# Patient Record
Sex: MALE | HISPANIC OR LATINO | Employment: STUDENT | URBAN - METROPOLITAN AREA
[De-identification: names, ages, dates, MRNs, and addresses within clinical notes are randomized per-mention and may not be internally consistent; named-entity substitution may affect disease eponyms.]

---
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
## 2025-02-28 ENCOUNTER — OFFICE VISIT (OUTPATIENT)
Dept: FAMILY MEDICINE CLINIC | Facility: CLINIC | Age: 20
End: 2025-02-28
Payer: COMMERCIAL

## 2025-02-28 VITALS
HEIGHT: 65 IN | BODY MASS INDEX: 26.66 KG/M2 | TEMPERATURE: 96.1 F | HEART RATE: 62 BPM | SYSTOLIC BLOOD PRESSURE: 126 MMHG | WEIGHT: 160 LBS | OXYGEN SATURATION: 98 % | DIASTOLIC BLOOD PRESSURE: 64 MMHG | RESPIRATION RATE: 20 BRPM

## 2025-02-28 DIAGNOSIS — Z11.59 NEED FOR HEPATITIS C SCREENING TEST: ICD-10-CM

## 2025-02-28 DIAGNOSIS — Z11.4 SCREENING FOR HIV (HUMAN IMMUNODEFICIENCY VIRUS): ICD-10-CM

## 2025-02-28 DIAGNOSIS — Z76.89 ENCOUNTER TO ESTABLISH CARE: Primary | ICD-10-CM

## 2025-02-28 DIAGNOSIS — Z87.19 HX OF HEMORRHOIDS: ICD-10-CM

## 2025-02-28 PROCEDURE — 99203 OFFICE O/P NEW LOW 30 MIN: CPT

## 2025-02-28 RX ORDER — HYDROCORTISONE 25 MG/G
CREAM TOPICAL 2 TIMES DAILY
Qty: 28 G | Refills: 0 | Status: SHIPPED | OUTPATIENT
Start: 2025-02-28 | End: 2025-03-14

## 2025-02-28 NOTE — PATIENT INSTRUCTIONS
"Patient Education     Dieta con alto contenido de fibra   Conceptos Básicos   Redactado por los médicos y editores de UpToDate   ¿Qué es la fibra? -- La fibra es gopal sustancia que se encuentra en algunas frutas, verduras y cereales. La mayor parte de la fibra pasa por el cuerpo sin digerirse, radha puede afectar la manera en que digiere otros alimentos y también puede mejorar nahid evacuaciones.  Existen dos tipos de fibra. El primer tipo se conoce anum \"fibra soluble\" y se encuentra en frutas, mino, cebada, frijoles y arvejas. El otro tipo se conoce anum \"fibra insoluble\" y se encuentra en el ilvier, el izquierdo y otros cereales.  Los dos tipos de fibra que se consumen se denominan \"fibra dietaria\".  ¿Por qué es importante la fibra para mi addie? -- La fibra puede ayudar a que nahid evacuaciones kassie más blandas y regulares. Agregar fibra a carney alimentación puede ayudar a resolver problemas anum el estreñimiento, las hemorroides y la diarrea. Además, puede ayudar a evitar \"accidentes\", si tiene problemas para controlar nahid evacuaciones.  Ingerir suficiente fibra también puede ayudar a reducir el riesgo de sufrir gopal enfermedad cardíaca, un accidente cerebrovascular (derrame) y diabetes tipo 2. Ragland se debe a que la fibra puede ayudar a bajar el colesterol y controlar el azúcar en woo.  ¿Cuánta fibra necesito? -- La cantidad recomendada de fibra es de 20 a 35 gramos por día. La etiqueta de información nutricional en los alimentos envasados indica la cantidad de fibra que tiene cada porción (figura 1).  ¿Cómo puedo asegurarme de que estoy consumiendo la cantidad suficiente de fibra? -- Para asegurarse de consumir la cantidad suficiente de fibra, coma muchas frutas, verduras y cereales que contengan fibra (tabla 1 y figura 2). Muchos cereales de desayuno también tienen mucha fibra.  Si no puede obtener suficiente fibra de los alimentos que consume, puede agregar salvado de livier a las comidas o puede usar suplementos de " "fibra, los cuales vienen en polvo, en obleas o en píldoras. Entre ellos se encuentran la semilla de psilio (ejemplos de marcas comerciales: Metamucil, Konsyl), la metilcelulosa (ejemplo de misty comercial: Citrucel), el policarbófilo (ejemplo de misty comercial: FiberCon) y la dextrina de livier (ejemplo de misty comercial: Benefiber). Si vern un suplemento de fibra, asegúrese de leer la etiqueta para saber qué cantidad kem. Si tiene dudas, pregunte a carney médico o enfermero.  ¿Cuáles son los efectos secundarios de la fibra? -- Al empezar a comer más fibra, podría sentir el área del estómago hinchada o tener gases o calambres. Puede evitar estos efectos secundarios si agrega la fibra a carney dieta de a poco.  Algunas personas se sienten peor cuando comen más fibra o cuando myrtle suplementos de fibra. Si se siente peor después de incorporar fibra a carney dieta, puede probar con menos cantidad para pamela si eso ayuda.  Todos los artículos se actualizan a medida que se descubre nueva evidencia y culmina nuestro proceso de evaluación por homólogos   Jessica artículo se recuperó de UpToDate el: Feb 28, 2024.  Artículo 15632 Versión 8.0.es-419.1  Release: 32.2.4 - C32.58  © 2024 UpToDate, Inc. Todos los derechos reservados.  figura 1: Etiqueta de información nutricional - Fibra     Jessica es un ejemplo de gopal etiqueta de información nutricional. Para saber cuánta fibra contiene un alimento, consulte el renglón que dice \"fibra dietaria\". También es importante observar el tamaño de la porción. Jesisca alimento tiene 7 gramos de fibra en cada porción y cada porción es gopal taza.  Gráfico 73747 Versión 8.0  tabla 1: Cantidad de fibra en diferentes alimentos  Alimento  Porción  Gramos de fibra    Frutas    Manzana (con cáscara) 1 manzana mediana 4.4   Plátano 1 plátano mediano 3.1   Naranjas 1 naranja 3.1   Ciruelas pasas 1 taza, sin semillas 12.4   Jugos    Manzana, sin endulzar, con ácido ascórbico agregado 1 taza 0.5   chase [place], " enlatado, endulzado 1 taza 0.2   Uva, sin endulzar, con ácido ascórbico agregado 1 taza 0.5   Simms 1 taza 0.7   Verduras    Cocidas   Frijoles verdes (habichuelas) 1 taza 4.0   Zanahorias 1/2 taza, cortadas 2.3   Arvejas 1 taza 8.8   Papa (horneada, con cáscara) 1 papa mediana 3.8   Crudas   Pepino (con piel) 1 pepino 1.5   Verena 1 taza, cortada 0.5   Tomate 1 tomate mediano 1.5   Espinaca 1 taza 0.7   Legumbres   Frijoles cocidos, enlatados, sin sal agregada 1 taza 13.9   Frijoles, enlatados 1 taza 13.6   Frijoles de lima (habas), enlatados 1 taza 11.6   Lentejas, hervidas 1 taza 15.6   Panes, pastas, harinas    Panecillos de salvado 1 panecillo mediano 5.2   Cereal de mino, cocido 1 taza 4.0   Pan stone 1 rebanada 0.6   Pan de livier integral 1 rebanada 1.9   Pasta y arroz, cocidos   Macarrones 1 taza 2.5   Arroz, integral 1 taza 3.5   Arroz, stone 1 taza 0.6   Fideos (espagueti, comunes) 1 taza 2.5   Frank secos    Almendras 1/2 taza 8.7   Maní 1/2 taza 7.9   Para saber cuánta fibra y otros nutrientes hay en diferentes alimentos, visite la central de datos de alimentación en el sitio web del [employer] [place] ([place] Department of Agriculture, USDA, FoodData Central).  Gráfico 68225 Versión 6.0  figura 2: Alimentos con fibra     Entre los alimentos con mucha fibra se incluyen las ciruelas, las manzanas, las naranjas, los plátanos, las arvejas, los frijoles verdes (habichuelas), los frijoles rojos, la mino cocida, las almendras, el maní y el pan de livier integral.  Gráfico 67351 Versión 1.0  Exención de responsabilidad y uso de la información del consumidor   Descargo de responsabilidad: esta información generalizada es un resumen limitado de información sobre el diagnóstico, el tratamiento y/o los medicamentos. No pretende ser exhaustiva y se debe utilizar anum herramienta para ayudar al usuario a comprender y/o evaluar las posibles opciones de diagnóstico y  tratamiento. No incluye toda la información sobre afecciones, tratamientos, medicamentos, efectos secundarios o riesgos puedan ser aplicables a un paciente específico. No tiene el propósito de servir anum recomendación médica ni de sustituir la recomendación médica, el diagnóstico o el tratamiento de un profesional de atención médica que se base en el examen y la evaluación de mickie profesional de la addie respecto a las circunstancias específicas y únicas del paciente. Los pacientes deben hablar con un profesional de atención médica para obtener información completa sobre carney addie, cuestiones médicas y opciones de tratamiento, incluidos los riesgos o los beneficios relacionados con el uso de medicamentos. Esta información no certifica que los tratamientos o medicamentos kassie seguros, eficaces o estén aprobados para tratar a un paciente específico. [employer] y nahid afiliados renuncian a cualquier garantía o responsabilidad relacionada con esta información o el uso de la misma.El uso de esta información está sujeto a las Condiciones de uso, disponibles en https://www.Ide[employer].com/en/know/clinical-effectiveness-terms. 2024© [employer] Inc. y nahid afiliados y/o licenciantes. Todos los derechos reservados.  Copyright   © 2024 [employer], Inc. Todos los derechos reservados.

## 2025-02-28 NOTE — PROGRESS NOTES
Name: Jani Guadalupe      : 2005      MRN: [id number]  Encounter Provider: Tomy Adhikari MD  Encounter Date: 2025   Encounter department: [facility]  :  Assessment & Plan  Encounter to establish care  History reviewed and EMR updated. VS and physical exam as noted below. Will send for preliminary blood work and adjust care accordingly. Recommend pt return in 1 month for Annual Physical.     Orders:    Basic metabolic panel; Future    Hx of hemorrhoids  Hx appears to support hemorrhoid dx however PE was deferred per pt request and due to symptoms having resolved. Will send Anusol HC to be used PRN and encouraged Sitz baths for further symptom management. Reviewed need to improve hydration (64oz of water a day) and increase fiber intake to improve bowel habits. Have provided home reading material for high fiber diet. Lastly reviewed need to decrease time sitting on toilet and to avoid activities that increase pelvic pressure such as heavy lifting.     Orders:    hydrocortisone (ANUSOL-HC) 2.5 % rectal cream; Apply topically 2 (two) times a day for 14 days    Screening for HIV (human immunodeficiency virus)    Orders:    HIV 1/2 AG/AB w Reflex [facility] for 2 yr old and above; Future    Need for hepatitis C screening test    Orders:    Hepatitis C antibody; Future           History of Present Illness   Jani Guadalupe is a 19 y.o. male presenting to clinic to establish care.    PMH: Hemorrhoid  Allergy: denying  Surgeryhx: circumcision  Familyhx:  mother prediabetes and father HTN  Social: works at [employer] where he is standing 8-10hrs a day, lives with aunt and her daughters, no smoking, no etoh, no other illicit drug use. Not currently sexually active but was previously with multiple female partners, endorsing consistent condom use, no hx of STD    Hemorrhoid: first appeared 2024. Was seen in the DR and given cream. Hemorrhoid now  "gone. Pt passing stool every 6 days and diet consists of mostly meat. Would like information on how to avoid recurrence and treatment.      Review of Systems   Constitutional:  Negative for chills and fever.   Respiratory:  Negative for shortness of breath.    Cardiovascular:  Negative for chest pain.   Gastrointestinal:  Positive for constipation. Negative for abdominal pain, blood in stool, diarrhea, nausea and vomiting.   Genitourinary:  Negative for dysuria and hematuria.   Neurological:  Negative for seizures.       Objective   /64 (BP Location: Left arm, Patient Position: Sitting, Cuff Size: Standard)   Pulse 62   Temp (!) 96.1 °F (35.6 °C) (Temporal)   Resp 20   Ht 5' 5\" (1.651 m)   Wt 72.6 kg (160 lb)   SpO2 98%   BMI 26.63 kg/m²      Physical Exam  Vitals and nursing note reviewed.   Constitutional:       General: He is not in acute distress.     Appearance: Normal appearance. He is not ill-appearing, toxic-appearing or diaphoretic.   Cardiovascular:      Rate and Rhythm: Normal rate and regular rhythm.      Pulses: Normal pulses.      Heart sounds: Normal heart sounds.   Pulmonary:      Effort: Pulmonary effort is normal.      Breath sounds: Normal breath sounds.   Genitourinary:     Comments: Deferred per pt request  Skin:     General: Skin is warm.   Neurological:      General: No focal deficit present.      Mental Status: He is alert.   Psychiatric:         Mood and Affect: Mood normal.         Behavior: Behavior normal.         "

## 2025-02-28 NOTE — ASSESSMENT & PLAN NOTE
Reviewed pathophysiology of hemmorrhoids   Spoke with Dr. Marley and patient added to Wed 2/28 clinic. Patient is able to to make this appt.  Location confirmed and discussed  option as well.

## 2025-02-28 NOTE — ASSESSMENT & PLAN NOTE
Hx appears to support hemorrhoid dx however PE was deferred per pt request and due to symptoms having resolved. Will send Anusol HC to be used PRN and encouraged Sitz baths for further symptom management. Reviewed need to improve hydration (64oz of water a day) and increase fiber intake to improve bowel habits. Have provided home reading material for high fiber diet. Lastly reviewed need to decrease time sitting on toilet and to avoid activities that increase pelvic pressure such as heavy lifting.     Orders:    hydrocortisone (ANUSOL-HC) 2.5 % rectal cream; Apply topically 2 (two) times a day for 14 days

## 2025-03-24 ENCOUNTER — APPOINTMENT (OUTPATIENT)
Dept: LAB | Facility: HOSPITAL | Age: 20
End: 2025-03-24
Payer: COMMERCIAL

## 2025-03-24 DIAGNOSIS — Z11.59 NEED FOR HEPATITIS C SCREENING TEST: ICD-10-CM

## 2025-03-24 DIAGNOSIS — Z11.4 SCREENING FOR HIV (HUMAN IMMUNODEFICIENCY VIRUS): ICD-10-CM

## 2025-03-24 DIAGNOSIS — Z76.89 ENCOUNTER TO ESTABLISH CARE: ICD-10-CM

## 2025-03-24 LAB
ANION GAP SERPL CALCULATED.3IONS-SCNC: 7 MMOL/L (ref 4–13)
BUN SERPL-MCNC: 11 MG/DL (ref 5–25)
CALCIUM SERPL-MCNC: 10 MG/DL (ref 8.4–10.2)
CHLORIDE SERPL-SCNC: 105 MMOL/L (ref 96–108)
CO2 SERPL-SCNC: 29 MMOL/L (ref 21–32)
CREAT SERPL-MCNC: 0.96 MG/DL (ref 0.6–1.3)
GFR SERPL CREATININE-BSD FRML MDRD: 112 ML/MIN/1.73SQ M
GLUCOSE P FAST SERPL-MCNC: 100 MG/DL (ref 65–99)
HCV AB SER QL: NORMAL
POTASSIUM SERPL-SCNC: 4.3 MMOL/L (ref 3.5–5.3)
SODIUM SERPL-SCNC: 141 MMOL/L (ref 135–147)

## 2025-03-24 PROCEDURE — 80048 BASIC METABOLIC PNL TOTAL CA: CPT

## 2025-03-24 PROCEDURE — 87389 HIV-1 AG W/HIV-1&-2 AB AG IA: CPT

## 2025-03-24 PROCEDURE — 86803 HEPATITIS C AB TEST: CPT

## 2025-03-24 PROCEDURE — 36415 COLL VENOUS BLD VENIPUNCTURE: CPT

## 2025-03-25 ENCOUNTER — RESULTS FOLLOW-UP (OUTPATIENT)
Dept: FAMILY MEDICINE CLINIC | Facility: CLINIC | Age: 20
End: 2025-03-25

## 2025-03-25 LAB — HIV 1+2 AB+HIV1 P24 AG SERPL QL IA: NORMAL

## 2025-04-04 ENCOUNTER — OFFICE VISIT (OUTPATIENT)
Dept: FAMILY MEDICINE CLINIC | Facility: CLINIC | Age: 20
End: 2025-04-04
Payer: COMMERCIAL

## 2025-04-04 VITALS
SYSTOLIC BLOOD PRESSURE: 121 MMHG | DIASTOLIC BLOOD PRESSURE: 73 MMHG | WEIGHT: 159.8 LBS | TEMPERATURE: 96.3 F | BODY MASS INDEX: 26.62 KG/M2 | HEART RATE: 58 BPM | HEIGHT: 65 IN | OXYGEN SATURATION: 98 % | RESPIRATION RATE: 18 BRPM

## 2025-04-04 DIAGNOSIS — Z00.00 ANNUAL PHYSICAL EXAM: Primary | ICD-10-CM

## 2025-04-04 DIAGNOSIS — Z23 NEED FOR VIRAL IMMUNIZATION: ICD-10-CM

## 2025-04-04 PROCEDURE — 99395 PREV VISIT EST AGE 18-39: CPT

## 2025-04-04 NOTE — PROGRESS NOTES
Adult Annual Physical  Name: Jani Guadalupe      : 2005      MRN: [id number]  Encounter Provider: Tomy Adhikari MD  Encounter Date: 2025   Encounter department: [facility] CARE [facility]    :  Assessment & Plan  Annual physical exam  VS and physical examination wnl. Age appropriate screenings and vaccinations reviewed and ordered as noted below. Healthy diet and exercise counseling provided.  Return in 1 year for annual physical.       Need for viral immunization  Patient does not wish to get flu vaccine nor COVID booster.           Preventive Screenings:  - Diabetes Screening: screening up-to-date  - Cholesterol Screening: screening not indicated   - Hepatitis C screening: screening up-to-date   - HIV screening: screening up-to-date   - Colon cancer screening: screening not indicated   - Lung cancer screening: screening not indicated   - Prostate cancer screening: screening not indicated     Immunizations:  - Immunizations due: Influenza and covid  - Risks/benefits immunizations discussed    - The patient declines recommended vaccines currently despite my recommendations      Counseling/Anticipatory Guidance:  - Alcohol: discussed moderation in alcohol intake and recommendations for healthy alcohol use.   - Sexual health: discussed sexually transmitted diseases, partner selection, use of condoms, avoidance of unintended pregnancy, and contraceptive alternatives.   - Diet: discussed recommendations for a healthy/well-balanced diet.   - Exercise: the importance of regular exercise/physical activity was discussed. Recommend exercise 3-5 times per week for at least 30 minutes.          History of Present Illness     Adult Annual Physical:  Patient presents for annual physical.     Diet and Physical Activity:  - Diet/Nutrition: well balanced diet.  - Exercise: no formal exercise.    General Health:  - Sleep: 4-6 hours of sleep on average and sleeps well.  - Hearing:  "normal hearing bilateral ears.  - Vision: no vision problems.  - Dental: regular dental visits, brushes teeth twice daily, brushes teeth three times daily and floss regularly.     Health:  - History of STDs: no.   - Urinary symptoms: none.     Review of Systems   Constitutional:  Negative for chills and fever.   Respiratory:  Negative for shortness of breath.    Cardiovascular:  Negative for chest pain.   Gastrointestinal:  Negative for abdominal pain, blood in stool, diarrhea, nausea and vomiting.   Genitourinary:  Negative for dysuria and hematuria.         Objective   /73 (BP Location: Left arm, Patient Position: Sitting, Cuff Size: Standard)   Pulse 58   Temp (!) 96.3 °F (35.7 °C) (Tympanic)   Resp 18   Ht 5' 5\" (1.651 m)   Wt 72.5 kg (159 lb 12.8 oz)   SpO2 98%   BMI 26.59 kg/m²     Physical Exam  Vitals and nursing note reviewed.   Constitutional:       Appearance: Normal appearance.   HENT:      Head: Normocephalic.      Right Ear: Tympanic membrane, ear canal and external ear normal.      Left Ear: Tympanic membrane, ear canal and external ear normal.      Nose: Nose normal.      Mouth/Throat:      Mouth: Mucous membranes are moist.      Pharynx: Oropharynx is clear.   Eyes:      Conjunctiva/sclera: Conjunctivae normal.   Cardiovascular:      Rate and Rhythm: Normal rate and regular rhythm.      Pulses: Normal pulses.      Heart sounds: Normal heart sounds.   Pulmonary:      Effort: Pulmonary effort is normal.      Breath sounds: Normal breath sounds.   Abdominal:      General: Abdomen is flat. There is no distension.      Palpations: Abdomen is soft.      Tenderness: There is no abdominal tenderness.   Musculoskeletal:         General: Normal range of motion.      Cervical back: Normal range of motion and neck supple.   Skin:     General: Skin is warm and dry.      Capillary Refill: Capillary refill takes less than 2 seconds.   Neurological:      General: No focal deficit present.      Mental " Status: He is alert.   Psychiatric:         Mood and Affect: Mood normal.         Behavior: Behavior normal.

## 2025-04-04 NOTE — PATIENT INSTRUCTIONS
"Patient Education     Examen físico de rutina para adultos   Conceptos Básicos   Redactado por los médicos y editores de [employer]   ¿Qué es un examen físico? -- Un examen físico es gopal consulta de rutina o \"revisión\" con carney médico. También se conoce anum \"consulta de bienestar\" o \"consulta preventiva\".  Bekah cada consulta, el médico hará lo siguiente:   Preguntará por carney addie física y mental   Preguntará sobre nahid hábitos, conductas y estilo de lia   Le hará un examen   Le administrará las vacunas que kassie necesarias   Hablará con usted sobre cualquier medicina que tome   Le dará consejos sobre carney addie   Responderá nahid preguntas  Hacerse revisiones periódicas es gopal parte importante del cuidado de carney addie. Puede ayudar a carney médico a encontrar y tratar cualquier problema que tenga. Rea también es importante para prevenir problemas de addie.  Un examen físico de rutina es diferente de gopal \"consulta por enfermedad\". Gopal consulta por enfermedad es cuando lo atiende un médico debido a un problema o inquietud de addie. Dado que los exámenes físicos se programan con anticipación, usted puede pensar en lo que quiere preguntarle al médico.  ¿Con qué frecuencia karina hacerme un examen físico? -- Depende de carney edad y de carney estado de addie. En general, en el sheyla de las personas mayores de 21 años:   Si tiene menos de 50 años, es posible que pueda hacerse un examen físico cada 3 años.   Si tiene 50 años o más, carney médico podría recomendarle un examen físico cada año.  Si tiene un padecimiento de addie crónico, félix anum diabetes o presión arterial mei, carney médico probablemente querrá verlo con más frecuencia.  ¿Qué sucede bekah un examen físico? -- En general, cada consulta incluirá:   Examen físico - El médico o enfermero revisará carney estatura, peso, frecuencia cardíaca y presión arterial. También le examinará los ojos y los oídos. Le preguntará cómo se siente y si tiene algún síntoma que le moleste.   Medicinas - Es gopal " "buena idea llevar gopal lista de todos las medicinas que vern cada vez que acude a la consulta médica. Carney médico le hablará sobre rina medicinas y responderá a rina preguntas. Dígale si tiene algún efecto secundario que le moleste. También debe informarle si tiene dificultades para pagar alguna de rina medicinas.   Hábitos y comportamientos - [place] incluye:   Carney dieta   Rina hábitos de ejercicio   Si fuma, km alcohol o consume drogas   Si es sexualmente activo   Si se siente seguro en casa  Carney médico hablará con usted sobre las cosas que puede hacer para mejorar carney addie y reducir el riesgo de tener problemas de addie. También ofrecerá ayuda y apoyo. Por ejemplo, si quiere dejar de fumar, puede darle consejos y recetarle medicinas. Si quiere mejorar carney alimentación o realizar más actividad física, carney médico también puede ayudarlo a lograr estos objetivos.   Pruebas de laboratorio, si son necesarias - Las pruebas que le realicen dependerán de carney edad y situación. Por ejemplo, es posible que carney médico quiera revisar carney:   Colesterol   Azúcar en woo   Nivel de maral   Vacunas - Las vacunas recomendadas dependerán de carney edad, carney addie y de las vacunas que ya haya recibido. Las vacunas son muy importantes porque pueden prevenir ciertas infecciones graves o mortales.   Análisis sobre las pruebas de detección - \"Detección\" significa revisar si hay enfermedades u otros problemas de addie antes de que causen síntomas. Carney médico puede recomendar pruebas de detección según carney edad, riesgo y preferencias. [place] podría incluir pruebas para detectar:   Cáncer, anum cáncer de seno, próstata, shira uterino, ovario, colorrectal, próstata, pulmón o piel   Infecciones de transmisión sexual tales anum clamidia y gonorrea   Padecimientos de addie mental tales anum depresión y ansiedad.  El médico le hablará sobre los diferentes tipos de pruebas de detección. Puede ayudarlo a decidir qué pruebas de detección debe hacerse. También le puede " explicar lo que podrían significar los resultados.   Responder preguntas - El examen físico es un buen momento para hacerle preguntas al médico o enfermero sobre carney addie. Si es necesario, también puede derivarlo a otros médicos o especialistas.  Los adultos mayores de 65 años a menudo también necesitan otros cuidados. A medida que envejece, carney médico hablará con usted sobre:   Cómo evitar las caídas en el hogar   Pruebas de audición o visión   Pruebas de memoria   Cómo kem nahid medicinas de manera mock   Asegurarse de tener la ayuda y el apoyo que necesita en casa  Todos los artículos se actualizan a medida que se descubre nueva evidencia y culmina nuestro proceso de evaluación por homólogos   Mickie artículo se recuperó de [employer] el: May 02, 2024.  Artículo [id number] Versión [id number]  Release: 32.4.3 - C32.122  © 2024 [employer] Todos los derechos reservados.  Exención de responsabilidad y uso de la información del consumidor   Descargo de responsabilidad: esta información generalizada es un resumen limitado de información sobre el diagnóstico, el tratamiento y/o los medicamentos. No pretende ser exhaustiva y se debe utilizar anum herramienta para ayudar al usuario a comprender y/o evaluar las posibles opciones de diagnóstico y tratamiento. No incluye toda la información sobre afecciones, tratamientos, medicamentos, efectos secundarios o riesgos puedan ser aplicables a un paciente específico. No tiene el propósito de servir anum recomendación médica ni de sustituir la recomendación médica, el diagnóstico o el tratamiento de un profesional de atención médica que se base en el examen y la evaluación de mickie profesional de la addie respecto a las circunstancias específicas y únicas del paciente. Los pacientes deben hablar con un profesional de atención médica para obtener información completa sobre carney addie, cuestiones médicas y opciones de tratamiento, incluidos los riesgos o los beneficios relacionados con  el uso de medicamentos. Esta información no certifica que los tratamientos o medicamentos kassie seguros, eficaces o estén aprobados para tratar a un paciente específico. [employer] y nahid afiliados renuncian a cualquier garantía o responsabilidad relacionada con esta información o el uso de la misma.El uso de esta información está sujeto a las Condiciones de uso, disponibles en [email or web address]. 2024© [employer] y nahid afiliados y/o licenciantes. Todos los derechos reservados.  Copyright   © 2024 [employer] Todos los derechos reservados.